# Patient Record
Sex: FEMALE | Race: WHITE | Employment: UNEMPLOYED | URBAN - METROPOLITAN AREA
[De-identification: names, ages, dates, MRNs, and addresses within clinical notes are randomized per-mention and may not be internally consistent; named-entity substitution may affect disease eponyms.]

---

## 2023-11-15 ENCOUNTER — APPOINTMENT (OUTPATIENT)
Dept: GENERAL RADIOLOGY | Age: 48
End: 2023-11-15
Payer: COMMERCIAL

## 2023-11-15 ENCOUNTER — HOSPITAL ENCOUNTER (EMERGENCY)
Age: 48
Discharge: HOME | End: 2023-11-15
Attending: EMERGENCY MEDICINE
Payer: COMMERCIAL

## 2023-11-15 VITALS
TEMPERATURE: 98.4 F | WEIGHT: 155 LBS | DIASTOLIC BLOOD PRESSURE: 74 MMHG | BODY MASS INDEX: 24.33 KG/M2 | HEIGHT: 67 IN | HEART RATE: 69 BPM | RESPIRATION RATE: 16 BRPM | SYSTOLIC BLOOD PRESSURE: 105 MMHG | OXYGEN SATURATION: 100 %

## 2023-11-15 DIAGNOSIS — S93.402A MODERATE LEFT ANKLE SPRAIN, INITIAL ENCOUNTER: Primary | ICD-10-CM

## 2023-11-15 PROCEDURE — 73630 X-RAY EXAM OF FOOT: CPT

## 2023-11-15 PROCEDURE — 73610 X-RAY EXAM OF ANKLE: CPT

## 2023-11-15 PROCEDURE — 99283 EMERGENCY DEPT VISIT LOW MDM: CPT

## 2023-11-15 PROCEDURE — 6370000000 HC RX 637 (ALT 250 FOR IP): Performed by: EMERGENCY MEDICINE

## 2023-11-15 RX ORDER — IBUPROFEN 600 MG/1
600 TABLET ORAL
Status: COMPLETED | OUTPATIENT
Start: 2023-11-15 | End: 2023-11-15

## 2023-11-15 RX ADMIN — IBUPROFEN 600 MG: 600 TABLET, FILM COATED ORAL at 19:57

## 2023-11-15 ASSESSMENT — ENCOUNTER SYMPTOMS: BACK PAIN: 0

## 2023-11-15 ASSESSMENT — PAIN - FUNCTIONAL ASSESSMENT: PAIN_FUNCTIONAL_ASSESSMENT: 0-10

## 2023-11-15 ASSESSMENT — PAIN SCALES - GENERAL: PAINLEVEL_OUTOF10: 9

## 2023-11-15 NOTE — ED TRIAGE NOTES
Pt to triage via w/c with c/o left foot pain. Pt reports she fell down one step and rolled her ankle. Pt with moderate swelling noted to out aspect of left foot/ankle.

## 2023-11-16 ENCOUNTER — TELEPHONE (OUTPATIENT)
Dept: ORTHOPEDIC SURGERY | Age: 48
End: 2023-11-16

## 2023-11-16 ENCOUNTER — APPOINTMENT (OUTPATIENT)
Dept: CT IMAGING | Age: 48
End: 2023-11-16
Payer: COMMERCIAL

## 2023-11-16 ENCOUNTER — HOSPITAL ENCOUNTER (EMERGENCY)
Age: 48
Discharge: HOME | End: 2023-11-16
Attending: STUDENT IN AN ORGANIZED HEALTH CARE EDUCATION/TRAINING PROGRAM
Payer: COMMERCIAL

## 2023-11-16 VITALS
SYSTOLIC BLOOD PRESSURE: 98 MMHG | TEMPERATURE: 98.4 F | HEART RATE: 71 BPM | RESPIRATION RATE: 17 BRPM | HEIGHT: 67 IN | WEIGHT: 155 LBS | BODY MASS INDEX: 24.33 KG/M2 | DIASTOLIC BLOOD PRESSURE: 62 MMHG | OXYGEN SATURATION: 99 %

## 2023-11-16 DIAGNOSIS — M25.572 ACUTE LEFT ANKLE PAIN: Primary | ICD-10-CM

## 2023-11-16 DIAGNOSIS — S92.022A CLOSED DISPLACED FRACTURE OF ANTERIOR PROCESS OF LEFT CALCANEUS, INITIAL ENCOUNTER: ICD-10-CM

## 2023-11-16 PROCEDURE — 6370000000 HC RX 637 (ALT 250 FOR IP): Performed by: STUDENT IN AN ORGANIZED HEALTH CARE EDUCATION/TRAINING PROGRAM

## 2023-11-16 PROCEDURE — 99284 EMERGENCY DEPT VISIT MOD MDM: CPT

## 2023-11-16 PROCEDURE — 73700 CT LOWER EXTREMITY W/O DYE: CPT

## 2023-11-16 RX ORDER — HYDROCODONE BITARTRATE AND ACETAMINOPHEN 7.5; 325 MG/1; MG/1
1 TABLET ORAL
Status: COMPLETED | OUTPATIENT
Start: 2023-11-16 | End: 2023-11-16

## 2023-11-16 RX ORDER — IBUPROFEN 800 MG/1
800 TABLET ORAL EVERY 6 HOURS PRN
Qty: 20 TABLET | Refills: 0 | Status: SHIPPED | OUTPATIENT
Start: 2023-11-16

## 2023-11-16 RX ORDER — HYDROCODONE BITARTRATE AND ACETAMINOPHEN 5; 325 MG/1; MG/1
1 TABLET ORAL EVERY 6 HOURS PRN
Qty: 10 TABLET | Refills: 0 | Status: SHIPPED | OUTPATIENT
Start: 2023-11-16 | End: 2023-11-21

## 2023-11-16 RX ADMIN — HYDROCODONE BITARTRATE AND ACETAMINOPHEN 1 TABLET: 7.5; 325 TABLET ORAL at 16:05

## 2023-11-16 ASSESSMENT — PAIN - FUNCTIONAL ASSESSMENT
PAIN_FUNCTIONAL_ASSESSMENT: 0-10
PAIN_FUNCTIONAL_ASSESSMENT: 0-10

## 2023-11-16 ASSESSMENT — PAIN SCALES - GENERAL
PAINLEVEL_OUTOF10: 3
PAINLEVEL_OUTOF10: 5
PAINLEVEL_OUTOF10: 6
PAINLEVEL_OUTOF10: 2

## 2023-11-16 ASSESSMENT — ENCOUNTER SYMPTOMS: COLOR CHANGE: 1

## 2023-11-16 ASSESSMENT — PAIN DESCRIPTION - ORIENTATION: ORIENTATION: LEFT

## 2023-11-16 ASSESSMENT — PAIN DESCRIPTION - LOCATION: LOCATION: ANKLE

## 2023-11-16 NOTE — DISCHARGE INSTRUCTIONS
CT imaging shows evidence of calcaneal fracture. For this injury you must be nonweightbearing utilizing your crutches and leaving the splint in place until follow-up with the orthopedic specialist.  Ari Basilio should be contacted by this provider to schedule follow-up. Take medication prescribed as directed for pain. Elevate your foot when at rest and apply ice as discussed. Return for worsening symptoms, concerns or questions.

## 2023-11-16 NOTE — ED NOTES
Ice pack applied to left ankle, ankle elevated and pillow placed underneath. Patient resting comfortably at this time.      Arlene Escamilla RN  11/16/23 8731

## 2023-11-16 NOTE — TELEPHONE ENCOUNTER
Called and lvm for pt, I informed the pt that Hudson Dejesus is off until next wed 11/22 and we are completely booked. I have sent message to anastacio to see if either  or  can see her next week.

## 2023-11-16 NOTE — ED PROVIDER NOTES
Emergency Department Provider Note       PCP: Nathalia Lozano MD   Age: 46 y.o. Sex: female     DISPOSITION Decision To Discharge 11/16/2023 06:23:33 PM       ICD-10-CM    1. Acute left ankle pain  M25.572 HYDROcodone-acetaminophen (NORCO) 5-325 MG per tablet      2. Closed displaced fracture of anterior process of left calcaneus, initial encounter  S92.022A           Medical Decision Making     Complexity of Problems Addressed:  1 or more acute illnesses that pose a threat to life or bodily function. Data Reviewed and Analyzed:  I independently ordered and reviewed each unique test.  I reviewed external records: ER visit from yesterday        I interpreted the CT Scan calcaneal fracture. Discussion of management or test interpretation. 49-year-old female presenting with progression of pain, swelling after ankle injury last night. Initial x-rays performed in this department yesterday reveal evidence of bony fragments in the joint space concerning for fracture, recommendation for CT at that time. We will obtain the CT given patient's return, increase pain meds to inclued opioids and touch base with [facility] for follow-up. The management of this patient was discussed with an external consultant. Risk of Complications and/or Morbidity of Patient Management:  Prescription drug management performed. Discussion with external consultants. Shared medical decision making was utilized in creating the patients health plan today. History       49-year-old female returns to this department after suffering a fall down several stairs yesterday. She originally presented last night with concerns over ankle sprain versus fracture. X-ray imaging showed evidence of bone fragments within the joint concerning for subtle fracture. She was placed in a walking boot, provided crutches and NSAIDs for pain.   Offer was made patient for stronger pain medications but she could declined at

## 2023-11-16 NOTE — TELEPHONE ENCOUNTER
Pt fell down the stairs at home and went to  [facility] they say it is a  left ankle sprain  Is there any way MET could see her on 11/22  She says a lot of swelling and pain

## 2023-11-16 NOTE — TELEPHONE ENCOUNTER
----- Message from Frederic Anderson sent at 11/16/2023  2:36 PM EST -----  Ryder Asif needs you to call her back [phone]

## 2023-11-16 NOTE — ED TRIAGE NOTES
Pt via wheelchair to triage for reports of R ankle pain & swelling. Pt states she was seen here last night & had XR done. Pt states pain & swelling have gotten increasingly worse. Pt states she was offered prescription for stronger pain medications which she denied last night. Pt states she took 2 tylenol & 2 advil earlier today without significant relief. Pt arrives with walking boot & crutches, but pt states she can't bear weight even in the walking boot without significant pain.

## 2023-11-16 NOTE — ED NOTES
I have reviewed discharge instructions with the patient. The patient verbalized understanding. Patient left ED via Discharge Method: ambulatory to Home with spouse. Opportunity for questions and clarification provided. Patient given 0 scripts. Follow up with ortho discussed.      Kendy Jones RN  11/15/23 9298

## 2023-11-16 NOTE — ED PROVIDER NOTES
during the making of this note. This software is not perfect and grammatical and other typographical errors may be present. This note has not been completely proofread for errors.      Viviane Resendiz MD  11/15/23 1050

## 2023-11-16 NOTE — TELEPHONE ENCOUNTER
----- Message from Timothy Coleman sent at 11/16/2023  1:44 PM EST -----  Pt fell down the stairs at home and went to [facility].   They said it is a left ankle sprain is there  Any way MET can she her on Wed 11/22 she has a lot of swelling and pain

## 2023-11-27 ENCOUNTER — TELEPHONE (OUTPATIENT)
Dept: ORTHOPEDIC SURGERY | Age: 48
End: 2023-11-27

## 2023-11-27 ENCOUNTER — OFFICE VISIT (OUTPATIENT)
Dept: ORTHOPEDIC SURGERY | Age: 48
End: 2023-11-27

## 2023-11-27 DIAGNOSIS — M25.572 ACUTE LEFT ANKLE PAIN: ICD-10-CM

## 2023-11-27 DIAGNOSIS — S92.112A CLOSED DISPLACED FRACTURE OF NECK OF LEFT TALUS, INITIAL ENCOUNTER: ICD-10-CM

## 2023-11-27 DIAGNOSIS — S92.022A CLOSED DISPLACED FRACTURE OF ANTERIOR PROCESS OF LEFT CALCANEUS, INITIAL ENCOUNTER: Primary | ICD-10-CM

## 2023-11-27 NOTE — PROGRESS NOTES
Name: Wiliam Meyers  YOB: 1975  Gender: female  MRN: [id number]    CC: Left ankle/foot injury    HPI:   11/15/2023: Left ankle/foot injury  11/15/2023: 11/16/2023: [facility] ED  11/27/2023: Initial visit: Left ankle/foot injury    ROS/Meds/PSH/PMH/FH/SH: reviewed today    Tobacco:  has no history on file for tobacco use. 2018: CVA    Physical Examination:  Patient appears to be alert and oriented with acceptable appearance. No obvious distress or SOB  CV: appears to have acceptable vascular color and capillary refill  Neuro: appears to have mostly intact light touch sensation   Skin: No open lesions; no redness; ankle/foot bruising/swelling  MS: No standing or gait exam  Left = lateral ankle/hindfoot pain extending into the dorsal lateral hindfoot    XR: Left: Nonweightbearing AP lateral mortise ankle plus AP oblique foot taken today with age indeterminate lateral ankle density; displaced dorsal talar neck avulsion fracture; displaced comminuted anterior calcaneal process fracture   XR Impression:  As above      Reviewed Test/Records/Documents:   11/15/2023: [facility] ED: Reflects left ankle/foot injury: ER x-ray  11/15/2023: [facility] ED left ankle x-ray: Left foot x-ray: Radiology impression: Suboptimal positioning limits evaluation. Possible small age-indeterminate curvilinear bone fragments seen lateral to the calcaneus, incompletely visualized. Consider outpatient CT as indicated  11/16/2023: [facility] ED: Reflects prior ER visit: X-ray and CT scan  11/16/2023: [facility] ED: Left ankle CT scan without contrast: Radiology impression:  1. There is minimally displaced comminuted fracture of the anterior process of the calcaneus  2.   The remaining visualized bony structures are grossly intact    My review of prior plain film x-rays and CT scan with Chopart level injury with dorsal talar neck avulsion fracture and comminuted intra-articular anterior calcaneal process fracture     Assessment:    Left ankle injury:

## 2023-11-28 NOTE — PROGRESS NOTES
The patient was prescribed a walker boot for the patient's left foot. The patient wears a size NA shoe and I fitted them with a M size boot. The patient was fitted and instructed on the use of prescribed walker boot. I explained how to fit themselves and that the plastic flexible piece should always be on the front of the boot and secured by the Velcro straps on top. The air bladder in the boot was adjusted according to proper fit and comfort. The patient walked a short distance and acknowledged satisfaction with current fit. I also explained that they need a heel lift or a higher heeled shoe for the uninvolved LE to help normalize gait and avoid excessive low back stress/strain due to leg length inequality created from walker boot. The patient was also prescribed and fitted with an even up shoe lift for the right side. She also received a boot undersleeve, size medium. Patient read and signed documenting they understand and agree to [facility]'s current DME return policy.

## 2023-12-15 ENCOUNTER — OFFICE VISIT (OUTPATIENT)
Dept: ORTHOPEDIC SURGERY | Age: 48
End: 2023-12-15

## 2023-12-15 DIAGNOSIS — S92.112G: ICD-10-CM

## 2023-12-15 DIAGNOSIS — S92.022G CLOSED DISPLACED FRACTURE OF ANTERIOR PROCESS OF LEFT CALCANEUS WITH DELAYED HEALING, SUBSEQUENT ENCOUNTER: Primary | ICD-10-CM

## 2023-12-15 DIAGNOSIS — M25.572 ACUTE LEFT ANKLE PAIN: ICD-10-CM

## 2023-12-15 NOTE — PROGRESS NOTES
Name: Ranulfo Swanson  YOB: 1975  Gender: female  MRN: [id number]    12/15/2023: Left ankle pain    HPI:   11/15/2023: Left ankle/foot injury  11/15/2023: 11/16/2023: [facility] ED  11/27/2023: Initial visit: Left ankle/foot injury    ROS/Meds/PSH/PMH/FH/SH: reviewed today    Tobacco:  has no history on file for tobacco use. 2018: CVA    Physical Examination:  Patient appears to be alert and oriented with acceptable appearance. No obvious distress or SOB  CV: appears to have acceptable vascular color and capillary refill  Neuro: appears to have mostly intact light touch sensation   Skin: No = open lesions; no redness; resolved ankle/foot bruising   MS: Standing: Plantigrade: Gait 3D boot  Left = lateral ankle/hindfoot pain extending into the dorsal lateral hindfoot    XR: Left: Nonweightbearing AP lateral mortise ankle plus AP oblique foot taken today with age indeterminate lateral malleolar and lateral talar process density; displaced dorsal talar neck avulsion fracture; displaced comminuted anterior calcaneal process fracture   XR Impression:  As above      Reviewed Test/Records/Documents:   11/15/2023: [facility] ED: Reflects left ankle/foot injury: ER x-ray  11/15/2023: [facility] ED left ankle x-ray: Left foot x-ray: Radiology impression: Suboptimal positioning limits evaluation. Possible small age-indeterminate curvilinear bone fragments seen lateral to the calcaneus, incompletely visualized. Consider outpatient CT as indicated  11/16/2023: [facility] ED: Reflects prior ER visit: X-ray and CT scan  11/16/2023: [facility] ED: Left ankle CT scan without contrast: Radiology impression:  1. There is minimally displaced comminuted fracture of the anterior process of the calcaneus  2.   The remaining visualized bony structures are grossly intact    My review of prior plain film x-rays and CT scan with Chopart level injury with dorsal talar neck avulsion fracture and comminuted intra-articular anterior calcaneal process

## 2023-12-27 ENCOUNTER — TELEPHONE (OUTPATIENT)
Dept: ORTHOPEDIC SURGERY | Age: 48
End: 2023-12-27

## 2023-12-28 NOTE — TELEPHONE ENCOUNTER
LVM asking where does she want the order sent to and that we can send it to [facility] or [facility].

## 2023-12-29 ENCOUNTER — TELEPHONE (OUTPATIENT)
Dept: ORTHOPEDIC SURGERY | Age: 48
End: 2023-12-29

## 2023-12-29 NOTE — TELEPHONE ENCOUNTER
Spoke to patient and gave she Radiology scheduling number and sent to [facility] to see which has availability 1st

## 2024-01-03 ENCOUNTER — TELEPHONE (OUTPATIENT)
Dept: ORTHOPEDIC SURGERY | Age: 49
End: 2024-01-03

## 2024-01-05 NOTE — TELEPHONE ENCOUNTER
As of today case is still pending with [facility] for the MRI    Tracking#[id number]

## 2024-01-17 DIAGNOSIS — S92.022A CLOSED DISPLACED FRACTURE OF ANTERIOR PROCESS OF LEFT CALCANEUS, INITIAL ENCOUNTER: ICD-10-CM

## 2024-01-17 DIAGNOSIS — M95.8 OSTEOCHONDRAL DEFECT OF TALUS: ICD-10-CM

## 2024-01-22 ENCOUNTER — OFFICE VISIT (OUTPATIENT)
Dept: ORTHOPEDIC SURGERY | Age: 49
End: 2024-01-22
Payer: COMMERCIAL

## 2024-01-22 DIAGNOSIS — S92.022A CLOSED DISPLACED FRACTURE OF ANTERIOR PROCESS OF LEFT CALCANEUS, INITIAL ENCOUNTER: Primary | ICD-10-CM

## 2024-01-22 PROCEDURE — 99214 OFFICE O/P EST MOD 30 MIN: CPT | Performed by: ORTHOPAEDIC SURGERY
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
## 2024-03-25 ENCOUNTER — OFFICE VISIT (OUTPATIENT)
Dept: ORTHOPEDIC SURGERY | Age: 49
End: 2024-03-25
Payer: COMMERCIAL

## 2024-03-25 DIAGNOSIS — S92.022K CLOSED DISPLACED FRACTURE OF ANTERIOR PROCESS OF LEFT CALCANEUS WITH NONUNION, SUBSEQUENT ENCOUNTER: Primary | ICD-10-CM

## 2024-03-25 PROCEDURE — 99214 OFFICE O/P EST MOD 30 MIN: CPT | Performed by: ORTHOPAEDIC SURGERY

## 2024-03-28 ENCOUNTER — HOSPITAL ENCOUNTER (OUTPATIENT)
Dept: CT IMAGING | Age: 49
Discharge: HOME | End: 2024-03-30
Payer: MEDICARE

## 2024-03-28 DIAGNOSIS — S92.022K CLOSED DISPLACED FRACTURE OF ANTERIOR PROCESS OF LEFT CALCANEUS WITH NONUNION, SUBSEQUENT ENCOUNTER: ICD-10-CM

## 2024-03-28 PROCEDURE — 73700 CT LOWER EXTREMITY W/O DYE: CPT

## 2024-04-04 DIAGNOSIS — S92.022K: ICD-10-CM

## 2024-04-04 DIAGNOSIS — S92.022A CLOSED DISPLACED FRACTURE OF ANTERIOR PROCESS OF LEFT CALCANEUS, INITIAL ENCOUNTER: Primary | ICD-10-CM

## 2024-04-08 ENCOUNTER — TELEPHONE (OUTPATIENT)
Dept: ORTHOPEDIC SURGERY | Age: 49
End: 2024-04-08

## 2024-04-11 ENCOUNTER — TELEPHONE (OUTPATIENT)
Dept: ORTHOPEDIC SURGERY | Age: 49
End: 2024-04-11

## 2024-04-11 RX ORDER — HYDROXYCHLOROQUINE SULFATE 200 MG/1
200 TABLET, FILM COATED ORAL
COMMUNITY

## 2024-04-11 NOTE — TELEPHONE ENCOUNTER
She has a pamphlet from Dr. Velázquez saying that it needed to be sent in today because of the prescription shortages everywhere so she can make sure she has it after surgery tomorrow rather than sending it in the day of to find that out.

## 2024-04-11 NOTE — PERIOP NOTE
Mother was in office today, she was advised of patients lab result.   Preop department called to notify patient of arrival time for scheduled procedure. Instructions given to   - Arrive at [facility] Entr[facility] [address].  - Remain NPO after midnight, unless otherwise indicated, including gum, mints, and ice chips.   - Have a responsible adult to drive patient to the hospital, stay during surgery, and patient will need supervision 24 hours after anesthesia.   - Use antibacterial soap in shower the night before surgery and on the morning of surgery.       Was patient contacted: NO  Voicemail left: yes  Numbers contacted: [phone]   Arrival time: 0930

## 2024-04-11 NOTE — TELEPHONE ENCOUNTER
She is having surgery tomorrow and was told to call and remind to send her meds and an antibiotic into [facility] [place].

## 2024-04-11 NOTE — PERIOP NOTE
Patient verified name and .  Order for consent not found in EHR and matches case posting; patient verifies procedure.   Type 1B surgery, phone assessment complete.  Orders not received.  Labs per surgeon: none  Labs per anesthesia protocol: hgb    Patient answered medical/surgical history questions at their best of ability. All prior to admission medications documented in EPIC.    Patient instructed to continue taking all prescription medications up to the day of surgery but to take only the following medications the day of surgery according to anesthesia guidelines with a small sip of water: Clobazam (Onfi), Aspirin,Clonazepam (Klonopin) if needed, gabapentin (Neurontin), Midazolam (Nayzilam) if needed, Duloxetine (Cymbalta), Atorvastatin (Lipitor), Pantoprazole (Protonix) Also, patient is requested to take 2 Tylenol in the morning and then again before bed on the day before surgery. Regular or extra strength may be used.       Patient informed that all vitamins and supplements should be held 7 days prior to surgery and NSAIDS 5 days prior to surgery. Prescription meds to hold:None    Patient instructed on the following:    > Arrive at [facility] Entr[facility], time of arrival to be called the day before by 1700  > NPO after midnight, unless otherwise indicated, including gum, mints, and ice chips  > Responsible adult must drive patient to the hospital, stay during surgery, and patient will need supervision 24 hours after anesthesia  > Use non moisturizing soap in shower the night before surgery and on the morning of surgery  > All piercings must be removed prior to arrival.    > Leave all valuables (money and jewelry) at home but bring insurance card and ID on DOS.   > You may be required to pay a deductible or co-pay on the day of your procedure. You can pre-pay by calling [phone] if your surgery is at the [facility] or [phone] if your surgery is at the [facility].  > Do not wear make-up, nail polish,

## 2024-04-11 NOTE — TELEPHONE ENCOUNTER
Called and spoke with patient and explained that the paperwork she has is to verify that the pharmacy will have the pain medication in stock for her to  after surgery and that  will prescribe pain medication day of surgery. Patient voiced understanding.

## 2024-04-11 NOTE — TELEPHONE ENCOUNTER
Left VCM explaining that  will send in Rx day of surgery and that the pharmacy requested is already in her chart.

## 2024-04-11 NOTE — PERIOP NOTE
Preop department called to notify patient of arrival time for scheduled procedure. Instructions given to   - Arrive at [facility] Entr[facility] [address].  - Remain NPO after midnight, unless otherwise indicated, including gum, mints, and ice chips.   - Have a responsible adult to drive patient to the hospital, stay during surgery, and patient will need supervision 24 hours after anesthesia.   - Use antibacterial soap in shower the night before surgery and on the morning of surgery.       Was patient contacted: yes, pt returned call  Voicemail left:   Numbers contacted: [phone]   Arrival time: 0930

## 2024-04-12 ENCOUNTER — ANESTHESIA EVENT (OUTPATIENT)
Dept: SURGERY | Age: 49
End: 2024-04-12
Payer: MEDICARE

## 2024-04-12 ENCOUNTER — HOSPITAL ENCOUNTER (OUTPATIENT)
Age: 49
Setting detail: OUTPATIENT SURGERY
Discharge: HOME | End: 2024-04-12
Attending: ORTHOPAEDIC SURGERY | Admitting: ORTHOPAEDIC SURGERY
Payer: MEDICARE

## 2024-04-12 ENCOUNTER — ANESTHESIA (OUTPATIENT)
Dept: SURGERY | Age: 49
End: 2024-04-12
Payer: MEDICARE

## 2024-04-12 ENCOUNTER — APPOINTMENT (OUTPATIENT)
Dept: GENERAL RADIOLOGY | Age: 49
End: 2024-04-12
Attending: ORTHOPAEDIC SURGERY
Payer: MEDICARE

## 2024-04-12 VITALS
BODY MASS INDEX: 25.9 KG/M2 | DIASTOLIC BLOOD PRESSURE: 70 MMHG | SYSTOLIC BLOOD PRESSURE: 114 MMHG | TEMPERATURE: 98.4 F | HEART RATE: 71 BPM | OXYGEN SATURATION: 99 % | HEIGHT: 67 IN | WEIGHT: 165 LBS | RESPIRATION RATE: 18 BRPM

## 2024-04-12 DIAGNOSIS — G89.18 ACUTE POST-OPERATIVE PAIN: Primary | ICD-10-CM

## 2024-04-12 LAB — HGB BLD-MCNC: 12.7 G/DL (ref 11.7–15.4)

## 2024-04-12 PROCEDURE — 85018 HEMOGLOBIN: CPT

## 2024-04-12 PROCEDURE — 2709999900 HC NON-CHARGEABLE SUPPLY: Performed by: ORTHOPAEDIC SURGERY

## 2024-04-12 PROCEDURE — 64445 NJX AA&/STRD SCIATIC NRV IMG: CPT | Performed by: ANESTHESIOLOGY

## 2024-04-12 PROCEDURE — 6360000002 HC RX W HCPCS: Performed by: NURSE ANESTHETIST, CERTIFIED REGISTERED

## 2024-04-12 PROCEDURE — 3700000001 HC ADD 15 MINUTES (ANESTHESIA): Performed by: ORTHOPAEDIC SURGERY

## 2024-04-12 PROCEDURE — 7100000000 HC PACU RECOVERY - FIRST 15 MIN: Performed by: ORTHOPAEDIC SURGERY

## 2024-04-12 PROCEDURE — 2580000003 HC RX 258: Performed by: ANESTHESIOLOGY

## 2024-04-12 PROCEDURE — 3700000000 HC ANESTHESIA ATTENDED CARE: Performed by: ORTHOPAEDIC SURGERY

## 2024-04-12 PROCEDURE — 6360000002 HC RX W HCPCS: Performed by: ANESTHESIOLOGY

## 2024-04-12 PROCEDURE — 6360000002 HC RX W HCPCS: Performed by: ORTHOPAEDIC SURGERY

## 2024-04-12 PROCEDURE — 7100000001 HC PACU RECOVERY - ADDTL 15 MIN: Performed by: ORTHOPAEDIC SURGERY

## 2024-04-12 PROCEDURE — 3600000014 HC SURGERY LEVEL 4 ADDTL 15MIN: Performed by: ORTHOPAEDIC SURGERY

## 2024-04-12 PROCEDURE — 64447 NJX AA&/STRD FEMORAL NRV IMG: CPT | Performed by: ANESTHESIOLOGY

## 2024-04-12 PROCEDURE — 28118 REMOVAL OF HEEL BONE: CPT | Performed by: ORTHOPAEDIC SURGERY

## 2024-04-12 PROCEDURE — 7100000010 HC PHASE II RECOVERY - FIRST 15 MIN: Performed by: ORTHOPAEDIC SURGERY

## 2024-04-12 PROCEDURE — 2500000003 HC RX 250 WO HCPCS: Performed by: NURSE ANESTHETIST, CERTIFIED REGISTERED

## 2024-04-12 PROCEDURE — 3600000004 HC SURGERY LEVEL 4 BASE: Performed by: ORTHOPAEDIC SURGERY

## 2024-04-12 RX ORDER — DEXAMETHASONE SODIUM PHOSPHATE 10 MG/ML
INJECTION, SOLUTION INTRAMUSCULAR; INTRAVENOUS
Status: COMPLETED | OUTPATIENT
Start: 2024-04-12 | End: 2024-04-12

## 2024-04-12 RX ORDER — SODIUM CHLORIDE 0.9 % (FLUSH) 0.9 %
5-40 SYRINGE (ML) INJECTION EVERY 12 HOURS SCHEDULED
Status: DISCONTINUED | OUTPATIENT
Start: 2024-04-12 | End: 2024-04-12 | Stop reason: HOSPADM

## 2024-04-12 RX ORDER — SODIUM CHLORIDE, SODIUM LACTATE, POTASSIUM CHLORIDE, CALCIUM CHLORIDE 600; 310; 30; 20 MG/100ML; MG/100ML; MG/100ML; MG/100ML
INJECTION, SOLUTION INTRAVENOUS CONTINUOUS
Status: DISCONTINUED | OUTPATIENT
Start: 2024-04-12 | End: 2024-04-12 | Stop reason: HOSPADM

## 2024-04-12 RX ORDER — OXYCODONE HYDROCHLORIDE 5 MG/1
5 TABLET ORAL
Status: DISCONTINUED | OUTPATIENT
Start: 2024-04-12 | End: 2024-04-12 | Stop reason: HOSPADM

## 2024-04-12 RX ORDER — LIDOCAINE HYDROCHLORIDE 20 MG/ML
INJECTION, SOLUTION EPIDURAL; INFILTRATION; INTRACAUDAL; PERINEURAL PRN
Status: DISCONTINUED | OUTPATIENT
Start: 2024-04-12 | End: 2024-04-12 | Stop reason: SDUPTHER

## 2024-04-12 RX ORDER — MIDAZOLAM HYDROCHLORIDE 2 MG/2ML
2 INJECTION, SOLUTION INTRAMUSCULAR; INTRAVENOUS
Status: COMPLETED | OUTPATIENT
Start: 2024-04-12 | End: 2024-04-12

## 2024-04-12 RX ORDER — SODIUM CHLORIDE 9 MG/ML
INJECTION, SOLUTION INTRAVENOUS PRN
Status: DISCONTINUED | OUTPATIENT
Start: 2024-04-12 | End: 2024-04-12 | Stop reason: HOSPADM

## 2024-04-12 RX ORDER — ROPIVACAINE HYDROCHLORIDE 5 MG/ML
INJECTION, SOLUTION EPIDURAL; INFILTRATION; PERINEURAL
Status: COMPLETED | OUTPATIENT
Start: 2024-04-12 | End: 2024-04-12

## 2024-04-12 RX ORDER — HALOPERIDOL 5 MG/ML
1 INJECTION INTRAMUSCULAR
Status: DISCONTINUED | OUTPATIENT
Start: 2024-04-12 | End: 2024-04-12 | Stop reason: HOSPADM

## 2024-04-12 RX ORDER — ASPIRIN 81 MG/1
81 TABLET ORAL 2 TIMES DAILY
Qty: 42 TABLET | Refills: 0 | Status: SHIPPED | OUTPATIENT
Start: 2024-04-12

## 2024-04-12 RX ORDER — CEPHALEXIN 500 MG/1
500 CAPSULE ORAL 4 TIMES DAILY
Qty: 12 CAPSULE | Refills: 0 | Status: SHIPPED | OUTPATIENT
Start: 2024-04-12

## 2024-04-12 RX ORDER — IPRATROPIUM BROMIDE AND ALBUTEROL SULFATE 2.5; .5 MG/3ML; MG/3ML
1 SOLUTION RESPIRATORY (INHALATION)
Status: DISCONTINUED | OUTPATIENT
Start: 2024-04-12 | End: 2024-04-12 | Stop reason: HOSPADM

## 2024-04-12 RX ORDER — HYDROMORPHONE HYDROCHLORIDE 2 MG/ML
0.5 INJECTION, SOLUTION INTRAMUSCULAR; INTRAVENOUS; SUBCUTANEOUS EVERY 5 MIN PRN
Status: DISCONTINUED | OUTPATIENT
Start: 2024-04-12 | End: 2024-04-12 | Stop reason: HOSPADM

## 2024-04-12 RX ORDER — SODIUM CHLORIDE 0.9 % (FLUSH) 0.9 %
5-40 SYRINGE (ML) INJECTION PRN
Status: DISCONTINUED | OUTPATIENT
Start: 2024-04-12 | End: 2024-04-12 | Stop reason: HOSPADM

## 2024-04-12 RX ORDER — PROPOFOL 10 MG/ML
INJECTION, EMULSION INTRAVENOUS PRN
Status: DISCONTINUED | OUTPATIENT
Start: 2024-04-12 | End: 2024-04-12 | Stop reason: SDUPTHER

## 2024-04-12 RX ORDER — EPHEDRINE SULFATE 5 MG/ML
INJECTION INTRAVENOUS PRN
Status: DISCONTINUED | OUTPATIENT
Start: 2024-04-12 | End: 2024-04-12 | Stop reason: SDUPTHER

## 2024-04-12 RX ORDER — ACETAMINOPHEN 500 MG
1000 TABLET ORAL ONCE
Status: DISCONTINUED | OUTPATIENT
Start: 2024-04-12 | End: 2024-04-12 | Stop reason: HOSPADM

## 2024-04-12 RX ORDER — OXYCODONE HYDROCHLORIDE 5 MG/1
5 TABLET ORAL EVERY 6 HOURS PRN
Qty: 20 TABLET | Refills: 0 | Status: SHIPPED | OUTPATIENT
Start: 2024-04-12 | End: 2024-04-17

## 2024-04-12 RX ORDER — PROCHLORPERAZINE EDISYLATE 5 MG/ML
5 INJECTION INTRAMUSCULAR; INTRAVENOUS
Status: DISCONTINUED | OUTPATIENT
Start: 2024-04-12 | End: 2024-04-12 | Stop reason: HOSPADM

## 2024-04-12 RX ORDER — LIDOCAINE HYDROCHLORIDE 10 MG/ML
1 INJECTION, SOLUTION INFILTRATION; PERINEURAL
Status: DISCONTINUED | OUTPATIENT
Start: 2024-04-12 | End: 2024-04-12 | Stop reason: HOSPADM

## 2024-04-12 RX ORDER — FENTANYL CITRATE 50 UG/ML
100 INJECTION, SOLUTION INTRAMUSCULAR; INTRAVENOUS
Status: COMPLETED | OUTPATIENT
Start: 2024-04-12 | End: 2024-04-12

## 2024-04-12 RX ORDER — NALOXONE HYDROCHLORIDE 0.4 MG/ML
INJECTION, SOLUTION INTRAMUSCULAR; INTRAVENOUS; SUBCUTANEOUS PRN
Status: DISCONTINUED | OUTPATIENT
Start: 2024-04-12 | End: 2024-04-12 | Stop reason: HOSPADM

## 2024-04-12 RX ADMIN — Medication 2000 MG: at 12:17

## 2024-04-12 RX ADMIN — EPINEPHRINE 5 ML: 1 INJECTION, SOLUTION, CONCENTRATE INTRAVENOUS at 10:56

## 2024-04-12 RX ADMIN — PROPOFOL 20 MG: 10 INJECTION, EMULSION INTRAVENOUS at 12:24

## 2024-04-12 RX ADMIN — ROPIVACAINE HYDROCHLORIDE 15 ML: 5 INJECTION, SOLUTION EPIDURAL; INFILTRATION; PERINEURAL at 11:00

## 2024-04-12 RX ADMIN — MIDAZOLAM 2 MG: 1 INJECTION INTRAMUSCULAR; INTRAVENOUS at 10:56

## 2024-04-12 RX ADMIN — SODIUM CHLORIDE, POTASSIUM CHLORIDE, SODIUM LACTATE AND CALCIUM CHLORIDE: 600; 310; 30; 20 INJECTION, SOLUTION INTRAVENOUS at 10:15

## 2024-04-12 RX ADMIN — DEXAMETHASONE SODIUM PHOSPHATE 4 MG: 10 INJECTION, SOLUTION INTRAMUSCULAR; INTRAVENOUS at 10:56

## 2024-04-12 RX ADMIN — FENTANYL CITRATE 50 MCG: 50 INJECTION INTRAMUSCULAR; INTRAVENOUS at 10:56

## 2024-04-12 RX ADMIN — ROPIVACAINE HYDROCHLORIDE 25 ML: 5 INJECTION, SOLUTION EPIDURAL; INFILTRATION; PERINEURAL at 10:56

## 2024-04-12 RX ADMIN — EPHEDRINE SULFATE 5 MG: 5 INJECTION INTRAVENOUS at 12:32

## 2024-04-12 RX ADMIN — EPHEDRINE SULFATE 5 MG: 5 INJECTION INTRAVENOUS at 12:39

## 2024-04-12 RX ADMIN — PROPOFOL 30 MG: 10 INJECTION, EMULSION INTRAVENOUS at 12:22

## 2024-04-12 RX ADMIN — LIDOCAINE HYDROCHLORIDE 30 MG: 20 INJECTION, SOLUTION EPIDURAL; INFILTRATION; INTRACAUDAL; PERINEURAL at 12:22

## 2024-04-12 RX ADMIN — PROPOFOL 120 MCG/KG/MIN: 10 INJECTION, EMULSION INTRAVENOUS at 12:25

## 2024-04-12 ASSESSMENT — PAIN - FUNCTIONAL ASSESSMENT: PAIN_FUNCTIONAL_ASSESSMENT: 0-10

## 2024-04-12 NOTE — PERIOP NOTE
PACU DISCHARGE NOTE    Patient has crutches for ambulation. Vital signs stable, pain well controlled, alert and oriented times three or at baseline, follow up per surgeon, no anesthetic complications.

## 2024-04-12 NOTE — ANESTHESIA PROCEDURE NOTES
Peripheral Block    Patient location during procedure: pre-op  Reason for block: post-op pain management and at surgeon's request  Start time: 4/12/2024 11:00 AM  End time: 4/12/2024 11:01 AM  Staffing  Performed: anesthesiologist   Anesthesiologist: Erik Villalpando MD  Performed by: Erik Villalpando MD  Authorized by: Erik Villalpando MD    Preanesthetic Checklist  Completed: patient identified, IV checked, site marked, risks and benefits discussed, surgical/procedural consents, equipment checked, pre-op evaluation, timeout performed, anesthesia consent given, oxygen available and monitors applied/VS acknowledged  Peripheral Block   Patient position: supine  Prep: ChloraPrep  Provider prep: mask  Patient monitoring: cardiac monitor, continuous pulse ox, frequent blood pressure checks, IV access and oxygen  Block type: Femoral  Adductor canal  Laterality: left  Injection technique: single-shot  Guidance: ultrasound guided    Needle   Needle type: insulated echogenic nerve stimulator needle   Needle gauge: 21 G  Needle localization: ultrasound guidance  Needle length: 10 cm  Assessment   Injection assessment: negative aspiration for heme, no paresthesia on injection, local visualized surrounding nerve on ultrasound and no intravascular symptoms  Paresthesia pain: none  Slow fractionated injection: yes  Hemodynamics: stable  Outcomes: patient tolerated procedure well and uncomplicated    Additional Notes  -Block placed for post op pain at surgeon's request.     -Ultrasound used to identify anatomy of nerve bundle.    -Needle placement and local injection at perineural area confirmed with real time ultrasound guidance.     -Local visualized with ultrasound surrounding nerve.    -Permanent Image taken and placed on chart.      Medications Administered  ropivacaine (NAROPIN) injection 0.5% - Perineural   15 mL - 4/12/2024 11:00:00 AM

## 2024-04-12 NOTE — DISCHARGE INSTRUCTIONS
POSTOPERATIVE SURGICAL INSTRUCTIONS/ INFORMATION:    Your narcotic (pain medication) and an antibiotic will be sent to the pharmacy listed in your chart.  Both of these medications should be taken as directed on the bottle.      If the surgery you had requires you to be nonweightbearing, in addition to the narcotic and antibiotic you will also have an aspirin prescription that should also be taken as directed on the bottle.  This will be taken until you are told to discontinue it.  If you run out of the prescription of aspirin and over-the-counter 81 mg aspirin will work as well.    Nonweightbearing to the affected extremity means you are not allowed to put pressure or any weight on the surgical extremity.  Information regarding scooters, crutches and other assistive devices will have been discussed at your presurgical office visit these devices are to be used until told otherwise by the doctor or nurse practitioner.    Pain can be hard to manage postoperatively especially if you had an anesthetic nerve block and do not know when it will wear off.  It is recommended that you start taking pain medication even with an anesthetic block the night of surgery.  The anesthetic nerve block can last up to 72 hours postoperatively, your leg will likely be numb as long as the anesthetic block is working.      If you are taking the pain medication as directed on the bottle and your pain is not managed or controlled you may double the medication, taking 2 tablets every 4-6 hours as needed for 24 hours.  Once pain level is controlled you will resume the recommended dosage on the bottle.    Pain medication may cause constipation, to help minimize this ensure you are drinking plenty of water after surgery.  You may start a stool softener and/or MiraLAX to help alleviate or mitigate this problem.  Please take these over-the-counter products as directed on the bottle.    Please make every effort to leave your postoperative dressing  smoking, and sedentary lifestyle greatly increases your risk for illness    A healthy diet, regular physical exercise & weight monitoring are important for maintaining a healthy lifestyle    You may be retaining fluid if you have a history of heart failure or if you experience any of the following symptoms:  Weight gain of 3 pounds or more overnight or 5 pounds in a week, increased swelling in our hands or feet or shortness of breath while lying flat in bed.  Please call your doctor as soon as you notice any of these symptoms; do not wait until your next office visit.    Recognize signs and symptoms of STROKE:    F-face looks uneven    A-arms unable to move or move unevenly    S-speech slurred or non-existent    T-time-call 911 as soon as signs and symptoms begin-DO NOT go       Back to bed or wait to see if you get better-TIME IS BRAIN.

## 2024-04-12 NOTE — OP NOTE
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    Operation In Detail:  Patient was evaluated in the preoperative area.  We had a long discussion about the procedure and postoperative protocols.  The patient was then brought back to the operating room suite and placed in the operating room table.  A timeout was taken to identify the patient, procedure being performed, and laterality.  After this the patient was prepped and draped in the normal sterile fashion using a Betadine solution and/or a ChloraPrep solution.  A timeout was then taken to identify the patient his name, date of birth, laterality, and procedure being performed.  We also identified allergies and any concerns about the operation.  Attention was then placed to the operative extremity.  A block was placed by the department of anesthesia.  In a preop surgical timeout the left lower extremity was identified as a correct surgical site and prepped and draped in a standard sterile fashions and ChloraPrep solution.  A lateral approach to the sinus Tarsi was then opened.  The peroneal tendons and sural nerve were carefully protected.  The extensor digitorum muscle belly was then reflected dorsally.  The underlying fracture fragments were identified under

## 2024-04-12 NOTE — PROGRESS NOTES
Pre-Surgery Prayer. PT was in bed preparing for surgery.  Family was at bedside.  PT expressed importance of and comfort in linda and prayer. PT is [employer]. CH offered prayer. CH offered additional support if needed.    . Jenn Lyon M.Div.

## 2024-04-12 NOTE — H&P
Outpatient Surgery History and Physical:  Kesha Hurtado was seen and examined.    CHIEF COMPLAINT:   left ankle.     PE:   BP 98/60   Pulse 76   Temp 98 °F (36.7 °C) (Oral)   Resp 20   Ht 1.702 m (5' 7\")   Wt 74.8 kg (165 lb)   SpO2 100%   BMI 25.84 kg/m²     Heart:   Regular rhythm      Lungs:  Are clear      Past Medical History:    Past Medical History:   Diagnosis Date    Anemia     Anxiety and depression     Cerebral artery occlusion with cerebral infarction (HCC)     2018    GERD (gastroesophageal reflux disease)     Hyperlipidemia     Migraines     Seizures (HCC)     Sjogren's disease (HCC)        Surgical History:   Past Surgical History:   Procedure Laterality Date    COLONOSCOPY      ENDOSCOPY, COLON, DIAGNOSTIC      INSERTABLE CARDIAC MONITOR  2018    Loop recorder    OTHER SURGICAL HISTORY Right 2017    lymph nodes removed right groin    ROTATOR CUFF REPAIR Left        Social History: Patient  reports that she has never smoked. She has never used smokeless tobacco. She reports current alcohol use. She reports that she does not use drugs.    Family History: History reviewed. No pertinent family history.    Allergies: Reviewed per EMR  Levetiracetam, Paroxetine hcl, and Zonisamide    Medications:    Prior to Admission medications    Medication Sig Start Date End Date Taking? Authorizing Provider   hydroxychloroquine (PLAQUENIL) 200 MG tablet Take 1 tablet by mouth three times a week Takes on Monday Wednesday and Friday   Yes Manpreet Alexander MD   gabapentin (NEURONTIN) 300 MG capsule 1 capsule in the morning and at bedtime. 12/31/23   Manpreet Alexander MD   cycloSPORINE (RESTASIS) 0.05 % ophthalmic emulsion INSTILL 1 DROP INTO EACH EYE TWICE DAILY 2/5/22   Manpreet Alexander MD   AIMOVIG 70 MG/ML SOAJ every 30 days    Manpreet Alexander MD   Brivaracetam (BRIVIACT) 75 MG TABS Take 1 tablet by mouth 2 times daily. Max Daily Amount: 2 tablets  Patient not taking: Reported on  4/11/2024    Manpreet Alexander MD   brivaracetam (BRIVIACT) 25 MG TABS tablet TAKE 1 TABLET BY MOUTH ONCE DAILY FOR 7 DAYS 1 TWICE DAILY FOR 7 DAYS 2 TWICE DAILY FOR 7 DAYS 3 TWICE DAILY FOR 7 DAYS  Patient not taking: Reported on 4/11/2024    Manpreet Alexander MD   aspirin 81 MG EC tablet Take 1 tablet by mouth daily    Manpreet Alexander MD   acetaminophen-codeine (TYLENOL #3) 300-30 MG per tablet Take 1 tablet by mouth every 6 hours as needed.    Manpreet Alexander MD   acetaminophen (TYLENOL) 500 MG tablet Take 1 tablet by mouth every 6 hours as needed    Manpreet Alexander MD   PREVIDENT 5000 SENSITIVE 1.1-5 % GEL BRUSH TEETH WITH A PEA SIZE AMOUNT ONCE DAILY AT BEDTIME. SPIT OUT EXCESS AMOUNT AND DO NOT RINSE. 10/25/23   Manpreet Alexander MD   pantoprazole (PROTONIX) 40 MG tablet Take 1 tablet by mouth in the morning and at bedtime    Manpreet Alexander MD   Midazolam (NAYZILAM) 5 MG/0.1ML SOLN SPRAY ONE SPRAY IN EACH NOSTRIL ONE TIME FOR SEIZURE LASTING 3 TO 5 MINUTES AS DIRECTED; DO NOT USE MORE THAN ONCE IN A 24 HOUR PERIOD    Manpreet Alexander MD   DULoxetine (CYMBALTA) 60 MG extended release capsule Take 1 capsule by mouth 2 times daily 11/30/23   Manpreet Alexander MD   clonazePAM (KLONOPIN) 0.5 MG tablet TAKE ONE TABLET BY MOUTH AS NEEDED FOR AURA OR SEIZURES (MUST LAST 90 DAYS) 9/20/23   Manpreet Alexander MD   cloBAZam (ONFI) 20 MG TABS tablet Take 1 tablet by mouth 2 times daily. 11/27/23   Manpreet Alexander MD   atorvastatin (LIPITOR) 40 MG tablet Take 1 tablet by mouth daily 11/7/23   Manpreet Alexander MD   ibuprofen (ADVIL;MOTRIN) 800 MG tablet Take 1 tablet by mouth every 6 hours as needed for Pain 11/16/23   Isaak Wheeler DO       The surgery is planned for the Left ankle fragment excision .        History and physical has been reviewed. The patient has been examined. There have been no significant clinical changes since the completion of the

## 2024-04-12 NOTE — ANESTHESIA POSTPROCEDURE EVALUATION
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 [S92.022K])    Surgeons: Alxe Velázquez III, MD Responsible Provider: Erik Villalpando MD    Anesthesia Type: TIVA ASA Status: 3            Anesthesia Type: TIVA    Jose Phase I: Jose Score: 7    Jose Phase II: Jose Score: 9    Anesthesia Post Evaluation    Patient location during evaluation: PACU  Patient participation: complete - patient participated  Level of consciousness: awake and alert  Pain score: 0  Airway patency: patent  Nausea & Vomiting: no nausea  Cardiovascular status: hemodynamically stable  Respiratory status: acceptable and nonlabored ventilation  Hydration status: stable  Multimodal analgesia pain management approach    No notable events documented.

## 2024-04-12 NOTE — ANESTHESIA PRE PROCEDURE
Department of Anesthesiology  Preprocedure Note       Name:  Kesha Hurtado   Age:  48 y.o.  :  1975                                          MRN:  [id number]         Date:  2024      Surgeon: Surgeon(s):  Alex Velázquez III, MD    Procedure: Procedure(s):  Left ankle fragment excision Patient has Loop Recorder    Medications prior to admission:   Prior to Admission medications    Medication Sig Start Date End Date Taking? Authorizing Provider   hydroxychloroquine (PLAQUENIL) 200 MG tablet Take 1 tablet by mouth three times a week Takes on  and Friday   Yes Manpreet Alexander MD   gabapentin (NEURONTIN) 300 MG capsule 1 capsule in the morning and at bedtime. 23   Manpreet Alexander MD   cycloSPORINE (RESTASIS) 0.05 % ophthalmic emulsion INSTILL 1 DROP INTO EACH EYE TWICE DAILY 22   Manpreet Alexander MD   AIMOVIG 70 MG/ML SOAJ every 30 days    Manpreet Alexander MD   Brivaracetam (BRIVIACT) 75 MG TABS Take 1 tablet by mouth 2 times daily. Max Daily Amount: 2 tablets  Patient not taking: Reported on 2024    Manpreet Alexander MD   brivaracetam (BRIVIACT) 25 MG TABS tablet TAKE 1 TABLET BY MOUTH ONCE DAILY FOR 7 DAYS 1 TWICE DAILY FOR 7 DAYS 2 TWICE DAILY FOR 7 DAYS 3 TWICE DAILY FOR 7 DAYS  Patient not taking: Reported on 2024    Manpreet Alexander MD   aspirin 81 MG EC tablet Take 1 tablet by mouth daily    Manpreet Alexander MD   acetaminophen-codeine (TYLENOL #3) 300-30 MG per tablet Take 1 tablet by mouth every 6 hours as needed.    Manpreet Alexander MD   acetaminophen (TYLENOL) 500 MG tablet Take 1 tablet by mouth every 6 hours as needed    Manpreet Alexander MD   PREVIDENT 5000 SENSITIVE 1.1-5 % GEL BRUSH TEETH WITH A PEA SIZE AMOUNT ONCE DAILY AT BEDTIME. SPIT OUT EXCESS AMOUNT AND DO NOT RINSE. 10/25/23   Manpreet Alexander MD   pantoprazole (PROTONIX) 40 MG tablet Take 1 tablet by mouth in the morning and at bedtime

## 2024-04-12 NOTE — ANESTHESIA PROCEDURE NOTES
Peripheral Block    Patient location during procedure: pre-op  Reason for block: post-op pain management and at surgeon's request  Start time: 4/12/2024 10:56 AM  End time: 4/12/2024 10:59 AM  Staffing  Performed: anesthesiologist   Anesthesiologist: Erik Villalpando MD  Performed by: Erik Villalpando MD  Authorized by: Erik Villalpando MD    Preanesthetic Checklist  Completed: patient identified, IV checked, site marked, risks and benefits discussed, surgical/procedural consents, equipment checked, pre-op evaluation, timeout performed, anesthesia consent given, oxygen available and monitors applied/VS acknowledged  Peripheral Block   Patient position: right lateral decubitus  Prep: ChloraPrep  Provider prep: mask  Patient monitoring: cardiac monitor, continuous pulse ox, frequent blood pressure checks, IV access and oxygen  Block type: Sciatic  Popliteal  Laterality: left  Injection technique: single-shot  Guidance: ultrasound guided    Needle   Needle type: insulated echogenic nerve stimulator needle   Needle gauge: 21 G  Needle localization: ultrasound guidance  Needle length: 10 cm  Assessment   Injection assessment: negative aspiration for heme, no paresthesia on injection, local visualized surrounding nerve on ultrasound and no intravascular symptoms  Paresthesia pain: none  Slow fractionated injection: yes  Hemodynamics: stable  Outcomes: uncomplicated and patient tolerated procedure well    Additional Notes  -Block placed for post op pain at surgeon's request.     -Ultrasound used to identify anatomy of nerve bundle.    -Needle placement and local injection at perineural area confirmed with real time ultrasound guidance.     -Local visualized with ultrasound surrounding nerve.    -Permanent Image taken and placed on chart.      Medications Administered  dexAMETHasone (DECADRON) (PF) 10 mg/mL injection - Other   4 mg - 4/12/2024 10:56:00 AM  mepivacaine 1.5% with EPINEPHrine 1:200,000 injection (ANESTHESIA USE

## 2024-04-16 ENCOUNTER — TELEPHONE (OUTPATIENT)
Dept: ORTHOPEDIC SURGERY | Age: 49
End: 2024-04-16

## 2024-04-19 ENCOUNTER — TELEPHONE (OUTPATIENT)
Dept: ORTHOPEDIC SURGERY | Age: 49
End: 2024-04-19

## 2024-04-19 NOTE — TELEPHONE ENCOUNTER
She is returning your call. Please try her again. She said for some reason her phone is acting up.

## 2024-04-24 ENCOUNTER — OFFICE VISIT (OUTPATIENT)
Dept: ORTHOPEDIC SURGERY | Age: 49
End: 2024-04-24
Payer: MEDICARE

## 2024-04-24 DIAGNOSIS — S92.022A CLOSED DISPLACED FRACTURE OF ANTERIOR PROCESS OF LEFT CALCANEUS, INITIAL ENCOUNTER: Primary | ICD-10-CM

## 2024-04-24 DIAGNOSIS — S92.022K CLOSED DISPLACED FRACTURE OF ANTERIOR PROCESS OF LEFT CALCANEUS WITH NONUNION, SUBSEQUENT ENCOUNTER: ICD-10-CM

## 2024-04-24 PROCEDURE — M5002 MISC BOOT SOCK: HCPCS | Performed by: NURSE PRACTITIONER

## 2024-04-24 PROCEDURE — 99024 POSTOP FOLLOW-UP VISIT: CPT | Performed by: NURSE PRACTITIONER

## 2024-04-24 PROCEDURE — L4360 PNEUMAT WALKING BOOT PRE CST: HCPCS | Performed by: NURSE PRACTITIONER

## 2024-04-24 NOTE — PROGRESS NOTES
Name: Kesha Hurtado  YOB: 1975  Gender: female  MRN: [id number]    Procedure Performed: Left anterior process calcaneus fracture excision       Date of Procedure: 04/12/2024      Subjective: Patient notes that she has done well since surgery.  She has been using crutches to help with ambulation.  She notes that when she stands up to walk feels like all the blood rushes to her foot and it throbs.      Physical Examination: Incisional area to the lateral midfoot appears to be healing well shows no signs of infection.  She has palpable pulses and intact sensation to the foot.  She has no sign of DVT at this time, denies of any calf or behind the knee pain and does present with a negative Homans' sign.  Range of motion to the ankle joint was performed without difficulty.  She does have noted swelling as well as some mild discoloration to the affected foot.        Imaging:   No imaging reviewed          Assessment:   Status post left anterior process calcaneal fracture excision.  We discussed progression of care today as well as expectations she can expect throughout her recovery.  Questions and concerns addressed, she verbalized understanding of today's conversation.  We did discuss and review signs and symptoms of infection including constitutional symptoms as well as incisional symptoms.      Plan:   3 This is stable chronic illness/condition  Treatment at this time: Sutures removed, Steri-Strips were placed.  Patient will be placed into a cam walker boot as a matter medical necessity where she  may progressively bear weight on the affected extremity she  can tolerate and swelling allows.  She may also begin to wean herself from the walker boot whenever she is comfortable.  The affected extremity may now get wet including showering, encouragement was given for the patient to soak with Epsom salts to help with additional incisional healing and swelling purposes.  She May discontinue daily

## 2024-04-24 NOTE — PROGRESS NOTES
Patient was given an extra Boot Sock.The patient was prescribed a walker boot for the patient's left foot. The patient wears a size 9.5 shoe and I fitted them with a M size boot. The patient was fitted and instructed on the use of prescribed walker boot. I explained how to fit themselves and that the plastic flexible piece should always be on the front of the boot and secured by the Velcro straps on top. The air bladder in the boot was adjusted according to proper fit and comfort. The patient walked a short distance and acknowledged satisfaction with current fit. I also explained that they need a heel lift or a higher heeled shoe for the uninvolved LE to help normalize gait and avoid excessive low back stress/strain due to leg length inequality created from walker boot.Patient read and signed documenting they understand and agree to [facility]'s current DME return policy.

## 2024-06-10 ENCOUNTER — TELEPHONE (OUTPATIENT)
Dept: ORTHOPEDIC SURGERY | Age: 49
End: 2024-06-10

## 2024-06-12 ENCOUNTER — TELEPHONE (OUTPATIENT)
Dept: ORTHOPEDIC SURGERY | Age: 49
End: 2024-06-12

## 2024-06-12 NOTE — TELEPHONE ENCOUNTER
Appointment Request  (Newest Message First)  Kesha Hurtado  [facility]  Staff2 days ago     LN  Appointment Request From: Kesha Hurtado     With Provider: MERCEDES THOMAS MD [[facility]]     Preferred Date Range: 6/11/2024 - 6/13/2024     Preferred Times: Any Time     Reason for visit: Request an Appointment     Comments:  Pain in ankle again

## 2024-06-12 NOTE — TELEPHONE ENCOUNTER
Spoke with patient. She denied any new injuries. She feels that pain and swelling do correlate with more activity. I let her know that all of this is normal 2 months post op and advised her to elevate, ice,and use anti inflammatories as needed. She will call us back if she is still concerned in a few weeks and we can get her scheduled.

## 2024-06-17 ENCOUNTER — PATIENT MESSAGE (OUTPATIENT)
Dept: ORTHOPEDIC SURGERY | Age: 49
End: 2024-06-17

## 2024-06-17 NOTE — TELEPHONE ENCOUNTER
From: Kesha Hurtado  To: Dr. Mercedes Thomas  Sent: 6/10/2024 10:00 AM EDT  Subject: Appointment Request    Appointment Request From: Kesha Hurtaod    With Provider: MERCEDES THOMAS MD [[facility]]    Preferred Date Range: 6/11/2024 – 6/13/2024    Preferred Times: Any Time    Reason for visit: Request an Appointment    Comments:  Pain in ankle again  
No

## 2024-07-05 ENCOUNTER — TELEPHONE (OUTPATIENT)
Dept: ORTHOPEDIC SURGERY | Age: 49
End: 2024-07-05

## 2024-07-05 NOTE — TELEPHONE ENCOUNTER
Pt called wants to seen asap.please let me know ,she said she was told she could be seen ,would not have to wait thanks

## (undated) DEVICE — PRECISION THIN (9.0 X 0.38 X 31.0MM)

## (undated) DEVICE — BANDAGE COMPR L5YDXW2IN FOAM CO FLX

## (undated) DEVICE — GOWN,SIRUS,NONRNF,SETINSLV,XL,20/CS: Brand: MEDLINE

## (undated) DEVICE — GOWN,ECLIPSE,POLYRNF,BRTHSLV,XL,30/CS: Brand: MEDLINE

## (undated) DEVICE — ZIMMER® STERILE DISPOSABLE TOURNIQUET CUFF WITH PLC, DUAL PORT, SINGLE BLADDER, 18 IN. (46 CM)

## (undated) DEVICE — FOOT DR TOLLISON & WOMACK: Brand: MEDLINE INDUSTRIES, INC.

## (undated) DEVICE — SOLUTION IRRIG 1000ML 0.9% SOD CHL USP POUR PLAS BTL

## (undated) DEVICE — GLOVE SURG SZ 8 L12IN FNGR THK79MIL GRN LTX FREE

## (undated) DEVICE — BANDAGE GZ W2XL75IN ST RAYON POLY CNFRM STRTCH LTWT

## (undated) DEVICE — GLOVE SURG SZ 65 L12IN FNGR THK79MIL GRN LTX FREE

## (undated) DEVICE — GLOVE SURG SZ 65 CRM LTX FREE POLYISOPRENE POLYMER BEAD ANTI

## (undated) DEVICE — BNDG,ELSTC,MATRIX,STRL,3"X5YD,LF,HOOK&LP: Brand: MEDLINE

## (undated) DEVICE — DRESSING PETRO W3XL8IN OIL EMUL N ADH GZ KNIT IMPREG CELOS